# Patient Record
Sex: MALE | Race: BLACK OR AFRICAN AMERICAN | NOT HISPANIC OR LATINO | URBAN - METROPOLITAN AREA
[De-identification: names, ages, dates, MRNs, and addresses within clinical notes are randomized per-mention and may not be internally consistent; named-entity substitution may affect disease eponyms.]

---

## 2020-03-18 ENCOUNTER — HOSPITAL ENCOUNTER (EMERGENCY)
Facility: HOSPITAL | Age: 35
Discharge: HOME | End: 2020-03-18
Attending: EMERGENCY MEDICINE
Payer: COMMERCIAL

## 2020-03-18 VITALS
WEIGHT: 193 LBS | RESPIRATION RATE: 16 BRPM | SYSTOLIC BLOOD PRESSURE: 116 MMHG | OXYGEN SATURATION: 99 % | TEMPERATURE: 99.6 F | HEIGHT: 71 IN | BODY MASS INDEX: 27.02 KG/M2 | HEART RATE: 86 BPM | DIASTOLIC BLOOD PRESSURE: 74 MMHG

## 2020-03-18 DIAGNOSIS — J02.0 STREP PHARYNGITIS: Primary | ICD-10-CM

## 2020-03-18 LAB — S PYO DNA THROAT QL NAA+PROBE: DETECTED

## 2020-03-18 PROCEDURE — 63700000 HC SELF-ADMINISTRABLE DRUG: Performed by: EMERGENCY MEDICINE

## 2020-03-18 PROCEDURE — 87651 STREP A DNA AMP PROBE: CPT | Performed by: EMERGENCY MEDICINE

## 2020-03-18 PROCEDURE — 99283 EMERGENCY DEPT VISIT LOW MDM: CPT

## 2020-03-18 PROCEDURE — 63600000 HC DRUGS/DETAIL CODE: Performed by: EMERGENCY MEDICINE

## 2020-03-18 RX ORDER — DEXAMETHASONE SODIUM PHOSPHATE 10 MG/ML
10 INJECTION INTRAMUSCULAR; INTRAVENOUS ONCE
Status: COMPLETED | OUTPATIENT
Start: 2020-03-18 | End: 2020-03-18

## 2020-03-18 RX ORDER — PENICILLIN V POTASSIUM 250 MG/1
500 TABLET, FILM COATED ORAL ONCE
Status: COMPLETED | OUTPATIENT
Start: 2020-03-18 | End: 2020-03-18

## 2020-03-18 RX ORDER — PENICILLIN V POTASSIUM 500 MG/1
500 TABLET, FILM COATED ORAL 4 TIMES DAILY
Qty: 40 TABLET | Refills: 0 | Status: SHIPPED | OUTPATIENT
Start: 2020-03-18 | End: 2020-03-25

## 2020-03-18 RX ADMIN — PENICILLIN V POTASSIUM 500 MG: 250 TABLET, FILM COATED ORAL at 11:32

## 2020-03-18 RX ADMIN — DEXAMETHASONE SODIUM PHOSPHATE 10 MG: 10 INJECTION INTRAMUSCULAR; INTRAVENOUS at 11:30

## 2020-03-18 ASSESSMENT — ENCOUNTER SYMPTOMS
CHEST TIGHTNESS: 0
FATIGUE: 0
SORE THROAT: 1
DIZZINESS: 0
RHINORRHEA: 0
FEVER: 0
WEAKNESS: 0
VOICE CHANGE: 0
BACK PAIN: 0
ABDOMINAL PAIN: 0
DIARRHEA: 0
VOMITING: 0
COUGH: 0
HEADACHES: 0
SINUS CONGESTION: 0
ACTIVITY CHANGE: 0
CHILLS: 0
SHORTNESS OF BREATH: 0
NAUSEA: 0
TROUBLE SWALLOWING: 0

## 2020-03-18 NOTE — ED PROVIDER NOTES
HPI     Chief Complaint   Patient presents with   • Fever   • Sore Throat        34-year-old male presented to the emergency department for evaluation of throat pain.  Patient states the pain has been present over the past day and 1/2 to 2 days at this point.  And does endorse feeling warm at home.  Denies any associated cough.  No recent travels or sick contacts.  Patient states a history of recurrent strep pharyngitis.      History provided by:  Patient  Sore Throat   Location:  Generalized  Quality:  Aching  Severity:  Mild  Onset quality:  Gradual  Timing:  Constant  Progression:  Worsening  Chronicity:  Recurrent  Relieved by:  Nothing  Worsened by:  Drinking  Ineffective treatments:  None tried  Associated symptoms: no abdominal pain, no chest pain, no chills, no cough, no fever, no headaches, no rash, no rhinorrhea, no shortness of breath, no sinus congestion, no trouble swallowing and no voice change         Patient History     History reviewed. No pertinent past medical history.    History reviewed. No pertinent surgical history.    History reviewed. No pertinent family history.    Social History     Tobacco Use   • Smoking status: Former Smoker     Last attempt to quit: 2010     Years since quitting: 10.2   • Smokeless tobacco: Never Used   Substance Use Topics   • Alcohol use: Yes   • Drug use: Yes     Types: Marijuana     Comment: daily       Systems Reviewed from Nursing Triage:          Review of Systems     Review of Systems   Constitutional: Negative for activity change, chills, fatigue and fever.   HENT: Positive for sore throat. Negative for rhinorrhea, trouble swallowing and voice change.    Eyes: Negative for visual disturbance.   Respiratory: Negative for cough, chest tightness and shortness of breath.    Cardiovascular: Negative for chest pain and leg swelling.   Gastrointestinal: Negative for abdominal pain, diarrhea, nausea and vomiting.   Endocrine: Negative for polyuria.   Musculoskeletal:  "Negative for back pain.   Skin: Negative for rash.   Neurological: Negative for dizziness, weakness and headaches.   All other systems reviewed and are negative.       Physical Exam     ED Triage Vitals   Temp Heart Rate Resp BP SpO2   03/18/20 0919 03/18/20 0919 03/18/20 0919 03/18/20 0919 03/18/20 0919   37.4 °C (99.3 °F) 97 14 (!) 141/90 99 %      Temp Source Heart Rate Source Patient Position BP Location FiO2 (%) (Set)   03/18/20 0919 -- 03/18/20 1000 03/18/20 1000 --   Oral  Sitting Right upper arm                      Patient Vitals for the past 24 hrs:   BP Temp Temp src Pulse Resp SpO2 Height Weight   03/18/20 1100 122/77 -- -- 83 -- 99 % -- --   03/18/20 1000 (!) 112/57 -- -- 84 -- 98 % -- --   03/18/20 0937 -- 37.4 °C (99.4 °F) -- -- -- -- 1.803 m (5' 11\") 87.5 kg (193 lb)   03/18/20 0919 (!) 141/90 37.4 °C (99.3 °F) Oral 97 14 99 % -- --                                       Physical Exam   Constitutional: He is oriented to person, place, and time. He appears well-developed and well-nourished.   HENT:   Head: Normocephalic and atraumatic.   Mouth/Throat: Mucous membranes are normal. No uvula swelling. Oropharyngeal exudate present. No posterior oropharyngeal edema or posterior oropharyngeal erythema.   Eyes: Pupils are equal, round, and reactive to light. EOM are normal.   Neck: Normal range of motion. Neck supple.   Cardiovascular: Normal rate and regular rhythm.   Pulmonary/Chest: Effort normal.   Abdominal: Soft. He exhibits no distension.   Musculoskeletal: Normal range of motion.   Lymphadenopathy:     He has cervical adenopathy.   Neurological: He is alert and oriented to person, place, and time.   Skin: Skin is warm and dry.   Psychiatric: He has a normal mood and affect.   Nursing note and vitals reviewed.           Procedures    ED Course & MDM     Labs Reviewed   GROUP A STREP BY PCR, THROAT - Abnormal       Result Value    Strep A PCR, Throat Detected (*)        No orders to display "               MDM  Number of Diagnoses or Management Options  Strep pharyngitis:   Diagnosis management comments: Patient will be there for strep pharyngitis.  Low suspicion for other respiratory pathogens given the patient's physical exam and labs.  Will give penicillin, Decadron.       Amount and/or Complexity of Data Reviewed  Clinical lab tests: ordered and reviewed    Risk of Complications, Morbidity, and/or Mortality  Presenting problems: moderate  Diagnostic procedures: moderate  Management options: moderate    Patient Progress  Patient progress: stable           Clinical Impressions as of Mar 18 1121   Strep pharyngitis        Mark Ballard DO  03/18/20 1124

## 2021-02-12 ENCOUNTER — APPOINTMENT (EMERGENCY)
Dept: RADIOLOGY | Facility: HOSPITAL | Age: 36
End: 2021-02-12
Attending: EMERGENCY MEDICINE
Payer: COMMERCIAL

## 2021-02-12 ENCOUNTER — HOSPITAL ENCOUNTER (EMERGENCY)
Facility: HOSPITAL | Age: 36
Discharge: HOME | End: 2021-02-12
Attending: EMERGENCY MEDICINE
Payer: COMMERCIAL

## 2021-02-12 VITALS
TEMPERATURE: 97.5 F | HEIGHT: 72 IN | RESPIRATION RATE: 18 BRPM | HEART RATE: 70 BPM | OXYGEN SATURATION: 99 % | WEIGHT: 180 LBS | BODY MASS INDEX: 24.38 KG/M2 | DIASTOLIC BLOOD PRESSURE: 68 MMHG | SYSTOLIC BLOOD PRESSURE: 109 MMHG

## 2021-02-12 DIAGNOSIS — F41.9 ANXIETY: Primary | ICD-10-CM

## 2021-02-12 LAB
ALBUMIN SERPL-MCNC: 4 G/DL (ref 3.4–5)
ALP SERPL-CCNC: 38 IU/L (ref 35–126)
ALT SERPL-CCNC: 26 IU/L (ref 16–63)
ANION GAP SERPL CALC-SCNC: 13 MEQ/L (ref 3–15)
AST SERPL-CCNC: 25 IU/L (ref 15–41)
ATRIAL RATE: 82
BASOPHILS # BLD: 0.03 K/UL (ref 0.01–0.1)
BASOPHILS NFR BLD: 0.4 %
BILIRUB SERPL-MCNC: 1.3 MG/DL (ref 0.3–1.2)
BUN SERPL-MCNC: 14 MG/DL (ref 8–20)
CALCIUM SERPL-MCNC: 9.3 MG/DL (ref 8.9–10.3)
CHLORIDE SERPL-SCNC: 102 MEQ/L (ref 98–109)
CO2 SERPL-SCNC: 22 MEQ/L (ref 22–32)
CREAT SERPL-MCNC: 1.2 MG/DL (ref 0.8–1.3)
DIFFERENTIAL METHOD BLD: NORMAL
EOSINOPHIL # BLD: 0.04 K/UL (ref 0.04–0.54)
EOSINOPHIL NFR BLD: 0.6 %
ERYTHROCYTE [DISTWIDTH] IN BLOOD BY AUTOMATED COUNT: 12.2 % (ref 11.6–14.4)
GFR SERPL CREATININE-BSD FRML MDRD: >60 ML/MIN/1.73M*2
GLUCOSE SERPL-MCNC: 78 MG/DL (ref 70–99)
HCT VFR BLDCO AUTO: 47.3 % (ref 40.1–51)
HGB BLD-MCNC: 16.2 G/DL (ref 13.7–17.5)
IMM GRANULOCYTES # BLD AUTO: 0.02 K/UL (ref 0–0.08)
IMM GRANULOCYTES NFR BLD AUTO: 0.3 %
LYMPHOCYTES # BLD: 1.37 K/UL (ref 1.2–3.5)
LYMPHOCYTES NFR BLD: 19.8 %
MAGNESIUM SERPL-MCNC: 2.1 MG/DL (ref 1.8–2.5)
MCH RBC QN AUTO: 31.7 PG (ref 28–33.2)
MCHC RBC AUTO-ENTMCNC: 34.2 G/DL (ref 32.2–36.5)
MCV RBC AUTO: 92.6 FL (ref 83–98)
MONOCYTES # BLD: 0.67 K/UL (ref 0.3–1)
MONOCYTES NFR BLD: 9.7 %
NEUTROPHILS # BLD: 4.79 K/UL (ref 1.7–7)
NEUTS SEG NFR BLD: 69.2 %
NRBC BLD-RTO: 0 %
P AXIS: 81
PDW BLD AUTO: 9.5 FL (ref 9.4–12.4)
PLATELET # BLD AUTO: 313 K/UL (ref 150–350)
POTASSIUM SERPL-SCNC: 4 MEQ/L (ref 3.6–5.1)
PR INTERVAL: 166
PROT SERPL-MCNC: 6.9 G/DL (ref 6–8.2)
QRS DURATION: 80
QT INTERVAL: 352
QTC CALCULATION(BAZETT): 411
R AXIS: 96
RBC # BLD AUTO: 5.11 M/UL (ref 4.5–5.8)
SODIUM SERPL-SCNC: 137 MEQ/L (ref 136–144)
T WAVE AXIS: 46
TROPONIN I SERPL-MCNC: <0.03 NG/ML
VENTRICULAR RATE: 82
WBC # BLD AUTO: 6.92 K/UL (ref 3.8–10.5)

## 2021-02-12 PROCEDURE — 80053 COMPREHEN METABOLIC PANEL: CPT | Performed by: EMERGENCY MEDICINE

## 2021-02-12 PROCEDURE — 85025 COMPLETE CBC W/AUTO DIFF WBC: CPT | Performed by: EMERGENCY MEDICINE

## 2021-02-12 PROCEDURE — 71045 X-RAY EXAM CHEST 1 VIEW: CPT

## 2021-02-12 PROCEDURE — 36415 COLL VENOUS BLD VENIPUNCTURE: CPT | Performed by: EMERGENCY MEDICINE

## 2021-02-12 PROCEDURE — 93005 ELECTROCARDIOGRAM TRACING: CPT | Performed by: EMERGENCY MEDICINE

## 2021-02-12 PROCEDURE — 84484 ASSAY OF TROPONIN QUANT: CPT | Performed by: EMERGENCY MEDICINE

## 2021-02-12 PROCEDURE — 83735 ASSAY OF MAGNESIUM: CPT | Performed by: EMERGENCY MEDICINE

## 2021-02-12 PROCEDURE — 63700000 HC SELF-ADMINISTRABLE DRUG: Performed by: EMERGENCY MEDICINE

## 2021-02-12 PROCEDURE — 99283 EMERGENCY DEPT VISIT LOW MDM: CPT | Mod: 25

## 2021-02-12 PROCEDURE — 93010 ELECTROCARDIOGRAM REPORT: CPT | Performed by: INTERNAL MEDICINE

## 2021-02-12 RX ORDER — HYDROXYZINE HYDROCHLORIDE 25 MG/1
25 TABLET, FILM COATED ORAL ONCE
Status: COMPLETED | OUTPATIENT
Start: 2021-02-12 | End: 2021-02-12

## 2021-02-12 RX ADMIN — HYDROXYZINE HYDROCHLORIDE 25 MG: 25 TABLET ORAL at 11:15

## 2021-02-12 ASSESSMENT — ENCOUNTER SYMPTOMS
VOMITING: 0
BACK PAIN: 0
FLANK PAIN: 0
COLOR CHANGE: 0
DIARRHEA: 0
HEADACHES: 0
NAUSEA: 1
ABDOMINAL PAIN: 0
FEVER: 0
COUGH: 0
DYSURIA: 0
PALPITATIONS: 0
TROUBLE SWALLOWING: 0
ARTHRALGIAS: 0
SHORTNESS OF BREATH: 1
FATIGUE: 0
SORE THROAT: 0

## 2021-02-12 NOTE — ED PROVIDER NOTES
Emergency Medicine Note  HPI   HISTORY OF PRESENT ILLNESS       History provided by:  Patient  Anxiety  Location:  General  Severity:  Mild  Onset quality:  Gradual  Duration:  5 days  Timing:  Intermittent  Progression:  Waxing and waning  Chronicity:  New  Context:  Unsure of triggers  Relieved by:  Nothing  Worsened by:  Nothing  Ineffective treatments:  None tried  Associated symptoms: chest pain, nausea and shortness of breath    Associated symptoms: no abdominal pain, no cough, no diarrhea, no fatigue, no fever, no headaches, no rash, no sore throat and no vomiting          Patient History   PAST HISTORY     Reviewed from Nursing Triage:      No past medical history on file.    No past surgical history on file.    No family history on file.    Social History     Tobacco Use   • Smoking status: Former Smoker     Quit date:      Years since quittin.1   • Smokeless tobacco: Never Used   Substance Use Topics   • Alcohol use: Yes   • Drug use: Yes     Types: Marijuana     Comment: daily         Review of Systems   REVIEW OF SYSTEMS     Review of Systems   Constitutional: Negative for fatigue and fever.   HENT: Negative for sore throat and trouble swallowing.    Respiratory: Positive for shortness of breath. Negative for cough.    Cardiovascular: Positive for chest pain. Negative for palpitations.   Gastrointestinal: Positive for nausea. Negative for abdominal pain, diarrhea and vomiting.   Genitourinary: Negative for dysuria and flank pain.   Musculoskeletal: Negative for arthralgias and back pain.   Skin: Negative for color change and rash.   Neurological: Negative for syncope and headaches.   All other systems reviewed and are negative.        VITALS     ED Vitals    Date/Time Temp Pulse Resp BP SpO2 [facility]   21 0939 36.4 °C (97.5 °F) 82 20 134/78 99 % PZ                       Physical Exam   PHYSICAL EXAM     Physical Exam  Vitals signs and nursing note reviewed.   Constitutional:       Appearance:  He is well-developed.   HENT:      Head: Normocephalic and atraumatic.   Eyes:      General: No scleral icterus.     Conjunctiva/sclera: Conjunctivae normal.   Neck:      Musculoskeletal: Neck supple.      Vascular: No JVD.   Cardiovascular:      Rate and Rhythm: Normal rate and regular rhythm.   Pulmonary:      Effort: Pulmonary effort is normal. No respiratory distress.      Breath sounds: Normal breath sounds.   Abdominal:      Palpations: Abdomen is soft.      Tenderness: There is no abdominal tenderness.   Musculoskeletal:         General: No tenderness.   Skin:     General: Skin is warm and dry.   Neurological:      Mental Status: He is alert.   Psychiatric:         Behavior: Behavior normal.           PROCEDURES     Procedures     DATA     Results     None          Imaging Results    None         No orders to display       Scoring tools                               ED Course & MDM   MDM / ED COURSE and CLINICAL IMPRESSIONS     MDM    ED Course as of Feb 12 1134 Fri Feb 12, 2021   1023 36yo male c/o feeling anxious x 5 days. Initially had insomnia. Now has intermittent palps, CP and anorexia. Occas feels SOB with palps. Occas lightheadedness. No new stressors, meds, drugs, etc. Had anxiety as child. No SI/HI.  EKG-NSR, T inversion III, nonspecific ST-T changes  Will check labs, CXR, give hydroxyzine and reassess.  Will check labs, CXR and reasess.    [NICO]   1131 Labs stable  Feels relaxed s/p hydroxyzine, but not dizzy or sleepy.  Feels comfortable returning home    [NICO]      ED Course User Index  [NICO] Zuleyka Gautam MD         Clinical Impressions as of Feb 12 1134   Anxiety            Zuleyka Gautam MD  02/12/21 1134

## 2021-03-15 ENCOUNTER — OFFICE VISIT (OUTPATIENT)
Dept: INTERNAL MEDICINE | Facility: HOSPITAL | Age: 36
End: 2021-03-15
Attending: STUDENT IN AN ORGANIZED HEALTH CARE EDUCATION/TRAINING PROGRAM
Payer: COMMERCIAL

## 2021-03-15 ENCOUNTER — APPOINTMENT (OUTPATIENT)
Dept: LAB | Facility: HOSPITAL | Age: 36
End: 2021-03-15
Attending: STUDENT IN AN ORGANIZED HEALTH CARE EDUCATION/TRAINING PROGRAM
Payer: COMMERCIAL

## 2021-03-15 VITALS
TEMPERATURE: 97.7 F | HEART RATE: 77 BPM | HEIGHT: 72 IN | WEIGHT: 183 LBS | SYSTOLIC BLOOD PRESSURE: 113 MMHG | OXYGEN SATURATION: 100 % | DIASTOLIC BLOOD PRESSURE: 62 MMHG | BODY MASS INDEX: 24.79 KG/M2

## 2021-03-15 DIAGNOSIS — F41.9 ANXIETY DISORDER, UNSPECIFIED: ICD-10-CM

## 2021-03-15 DIAGNOSIS — Z00.00 ENCOUNTER FOR GENERAL ADULT MEDICAL EXAMINATION WITHOUT ABNORMAL FINDINGS: ICD-10-CM

## 2021-03-15 DIAGNOSIS — F41.9 ANXIETY: Primary | ICD-10-CM

## 2021-03-15 DIAGNOSIS — Z00.00 HEALTH CARE MAINTENANCE: ICD-10-CM

## 2021-03-15 PROCEDURE — 3008F BODY MASS INDEX DOCD: CPT | Performed by: STUDENT IN AN ORGANIZED HEALTH CARE EDUCATION/TRAINING PROGRAM

## 2021-03-15 PROCEDURE — 99213 OFFICE O/P EST LOW 20 MIN: CPT | Mod: GC | Performed by: STUDENT IN AN ORGANIZED HEALTH CARE EDUCATION/TRAINING PROGRAM

## 2021-03-15 RX ORDER — QUETIAPINE FUMARATE 50 MG/1
50 TABLET, FILM COATED ORAL NIGHTLY
Qty: 30 TABLET | Refills: 0 | Status: SHIPPED | OUTPATIENT
Start: 2021-03-15 | End: 2021-04-10

## 2021-03-15 RX ORDER — HYDROXYZINE PAMOATE 25 MG/1
25 CAPSULE ORAL 2 TIMES DAILY PRN
Qty: 30 CAPSULE | Refills: 0 | Status: SHIPPED | OUTPATIENT
Start: 2021-03-15 | End: 2021-06-23

## 2021-03-15 ASSESSMENT — ENCOUNTER SYMPTOMS
RECTAL PAIN: 0
MUSCULOSKELETAL NEGATIVE: 1
EYE DISCHARGE: 0
FACIAL SWELLING: 0
BLOOD IN STOOL: 0
NAUSEA: 0
FEVER: 0
EYE ITCHING: 0
NEUROLOGICAL NEGATIVE: 1
DIARRHEA: 0
VOICE CHANGE: 0
STRIDOR: 0
CHOKING: 0
UNEXPECTED WEIGHT CHANGE: 0
SHORTNESS OF BREATH: 0
SINUS PRESSURE: 0
BRUISES/BLEEDS EASILY: 0
EYE REDNESS: 0
FATIGUE: 0
SINUS PAIN: 0
CHEST TIGHTNESS: 0
DIAPHORESIS: 0
EYE PAIN: 0
COUGH: 0
ADENOPATHY: 0
VOMITING: 0
ABDOMINAL PAIN: 0
SORE THROAT: 0
TROUBLE SWALLOWING: 0
NERVOUS/ANXIOUS: 1
CONSTIPATION: 0
ACTIVITY CHANGE: 0
WHEEZING: 0
ANAL BLEEDING: 0
EYES NEGATIVE: 1
PHOTOPHOBIA: 0
PALPITATIONS: 0

## 2021-03-15 ASSESSMENT — PATIENT HEALTH QUESTIONNAIRE - PHQ9: SUM OF ALL RESPONSES TO PHQ9 QUESTIONS 1 & 2: 0

## 2021-03-15 ASSESSMENT — PAIN SCALES - GENERAL: PAINLEVEL: 0-NO PAIN

## 2021-03-15 NOTE — PATIENT INSTRUCTIONS
Dear Mr Ocampo    In our appointment today, we talked about the following:  Establishing care        To-do's  Remember to carry out the following tests/referrals/follow-ups:  1. Please take 1/2 tablet of Seroquel for the first 2 weeks . If you tolerate it, then take 1 pill nighthly  2. Please do labs   3. Please call any psychiatrist to follow up       Please take note of the medication changes as mentioned above. Listed in this instructions packet is the most updated version of your medication list.     Next appointment  Let's plan to meet for a follow-up appointment in 1 month    Thank you for choosing [facility]!

## 2021-03-15 NOTE — PROGRESS NOTES
[facility]  History & Physical        CHIEF COMPLAINT   Chief Complaint   Patient presents with   • Establish Care        HISTORY OF PRESENT ILLNESS      This is a 35 y.o. male with a past medical history of anxiety,panic attacks and insomina,  who present today for establishing care.    Panic attacks /anxiety:  PAtiet think it all started during Hooks's weekeing. Patient couldn't sleep at night. Since that day, he started experiencing every day panic attacks where he felt his heart pounding, he was sweating, almost fainting and crying. He cant think of any trigger to all this situation. He did not try any medication. He had 2 ED vsit for panic attacks.   He denies mood swings, no suicidal ideation. He reports that over the last 2 weeks got better. He is currently waking up three times at night but is able to function .  -[facility]  -[facility]      PMHx: anxiety and panic attack  FMHx: does not know fam  SHX: none  Smoking: marijuana  Alcohol:once/twice a week  Drug use: none  Social:selling cars  Sexual life:multiple partners  dont use protection with one person  Vaccine: not sure ?      PAST MEDICAL AND SURGICAL HISTORY      PMHx:  History reviewed. No pertinent past medical history.    PSHx:  History reviewed. No pertinent surgical history.    MEDICATIONS        Current Outpatient Medications:   •  hydrOXYzine (VistariL) 25 mg capsule, Take 1 capsule (25 mg total) by mouth 2 (two) times a day as needed for itching for up to 10 days., Disp: 30 capsule, Rfl: 0  •  QUEtiapine (SEROqueL) 50 mg tablet, Take 1 tablet (50 mg total) by mouth nightly., Disp: 30 tablet, Rfl: 0    ALLERGIES      Patient has no known allergies.    FAMILY HISTORY      Family History   Family history unknown: Yes       SOCIAL HISTORY      Social History     Socioeconomic History   • Marital status: Single     Spouse name: None   • Number of children: None   • Years of education: None   • Highest education  level: None   Occupational History   • None   Social Needs   • Financial resource strain: None   • Food insecurity     Worry: None     Inability: None   • Transportation needs     Medical: None     Non-medical: None   Tobacco Use   • Smoking status: Former Smoker     Quit date:      Years since quittin.2   • Smokeless tobacco: Never Used   Substance and Sexual Activity   • Alcohol use: Yes   • Drug use: Yes     Types: Marijuana     Comment: daily   • Sexual activity: None   Lifestyle   • Physical activity     Days per week: None     Minutes per session: None   • Stress: None   Relationships   • Social connections     Talks on phone: None     Gets together: None     Attends Hoahaoism service: None     Active member of club or organization: None     Attends meetings of clubs or organizations: None     Relationship status: None   • Intimate partner violence     Fear of current or ex partner: None     Emotionally abused: None     Physically abused: None     Forced sexual activity: None   Other Topics Concern   • None   Social History Narrative   • None       REVIEW OF SYSTEMS      Review of Systems   Constitutional: Negative for activity change, diaphoresis, fatigue, fever and unexpected weight change.   HENT: Positive for postnasal drip. Negative for congestion, dental problem, drooling, ear discharge, ear pain, facial swelling, hearing loss, mouth sores, sinus pressure, sinus pain, sneezing, sore throat, tinnitus, trouble swallowing and voice change.    Eyes: Negative.  Negative for photophobia, pain, discharge, redness, itching and visual disturbance.   Respiratory: Negative for cough, choking, chest tightness, shortness of breath, wheezing and stridor.    Cardiovascular: Negative for chest pain, palpitations and leg swelling.   Gastrointestinal: Negative for abdominal pain, anal bleeding, blood in stool, constipation, diarrhea, nausea, rectal pain and vomiting.   Endocrine: Negative for cold intolerance and  heat intolerance.   Genitourinary: Negative.    Musculoskeletal: Negative.    Skin: Negative.    Allergic/Immunologic: Positive for environmental allergies.   Neurological: Negative.    Hematological: Negative for adenopathy. Does not bruise/bleed easily.   Psychiatric/Behavioral: The patient is nervous/anxious.        PHYSICAL EXAMINATION      Visit Vitals  /62 (BP Location: Right upper arm, Patient Position: Sitting)   Pulse 77   Temp 36.5 °C (97.7 °F) (Temporal)   Ht 1.829 m (6')   Wt 83 kg (183 lb)   SpO2 100%   BMI 24.82 kg/m²     Body mass index is 24.82 kg/m².    Physical Exam  Constitutional:       General: He is not in acute distress.     Appearance: Normal appearance. He is not ill-appearing.   HENT:      Head: Normocephalic and atraumatic.      Right Ear: There is no impacted cerumen.      Left Ear: There is no impacted cerumen.      Nose: No congestion or rhinorrhea.      Mouth/Throat:      Pharynx: No oropharyngeal exudate or posterior oropharyngeal erythema.   Eyes:      General: No scleral icterus.        Right eye: No discharge.         Left eye: No discharge.   Neck:      Vascular: No carotid bruit.   Cardiovascular:      Rate and Rhythm: Normal rate and regular rhythm.      Heart sounds: No murmur.   Pulmonary:      Effort: Pulmonary effort is normal. No respiratory distress.      Breath sounds: No stridor. No wheezing.   Abdominal:      General: Abdomen is flat. There is no distension.      Palpations: Abdomen is soft. There is no mass.      Tenderness: There is no abdominal tenderness.      Hernia: No hernia is present.   Musculoskeletal:      Right lower leg: No edema.      Left lower leg: No edema.   Lymphadenopathy:      Cervical: No cervical adenopathy.   Skin:     Coloration: Skin is not jaundiced.      Findings: No bruising or lesion.   Neurological:      General: No focal deficit present.      Mental Status: He is alert and oriented to person, place, and time.   Psychiatric:          Mood and Affect: Mood normal.                  Health Maintenance Due   Topic Date Due   • Varicella Vaccines (1 of 2 - 2-dose childhood series) Never done   • HIV Screening  Never done   • Hepatitis C Screening  Never done   • DTaP, Tdap, and Td Vaccines (1 - Tdap) Never done   • Influenza Vaccine (1) Never done          ASSESSMENT AND PLAN   Problem List Items Addressed This Visit        Other    Anxiety - Primary     DDX: pt can have a bipolar disorder or CARLOS with panic attack vs hyperthyroidism (less likely!)  -cannot start on SSRI incase he has BP disorder-inc risk of suicidal  - Start seroquel 0.5 tablet a day for a month and increase to 1 tablet if tolerated  - hydroxyzine PRN  -Psychology and psychiatry referral  -TSH         Relevant Orders    Ambulatory referral to [facility]    Ambulatory referral to Psychiatry    [facility]    Health care maintenance     -lipid panel- he is a 35 y o male pt  -HIV, hep C  -neisseria and gonorrhea          Relevant Orders    Lipid panel    HIV 1,2 AB P24 AG    Hepatitis C antibody    Neisseria gonorrhoeae DNA, PCR    Chlamydia/GC RNA:ThinPrep,Urine,Swab             Briseida Gama MD  03/15/21  5:26 PM

## 2021-03-15 NOTE — PROGRESS NOTES
I performed a history and physical examination of the patient and discussed the management with the Resident. I reviewed the Resident's note and agree with the documented findings and plan of care, except for my comments below or within the additional notes today.  Pt with panic/anxiety. Does not admit to having it before early February. Has been to the ED x2 recently. Had much insomnia at the time but this has improved recently; the panic is occurring every couple of days. Agree with low dose seroquel, referral for counseling and also psychiatry. Can breathe in paper bag if needed during an attack.  F/u in 3-4 wks.      STANLEY Worthington MD

## 2021-03-15 NOTE — ASSESSMENT & PLAN NOTE
DDX: pt can have a bipolar disorder or CARLOS with panic attack vs hyperthyroidism (less likely!)  -cannot start on SSRI incase he has BP disorder-inc risk of suicidal  - Start seroquel 0.5 tablet a day for a month and increase to 1 tablet if tolerated  - hydroxyzine PRN  -Psychology and psychiatry referral  -TSH

## 2021-03-16 LAB
CHOLEST SERPL-MCNC: 157 MG/DL (ref 100–199)
HCV AB S/CO SERPL IA: <0.1 S/CO RATIO (ref 0–0.9)
HDLC SERPL-MCNC: 55 MG/DL
HIV 1+2 AB+HIV1 P24 AG SERPL QL IA: NON REACTIVE
LDLC SERPL CALC-MCNC: 83 MG/DL (ref 0–99)
TRIGL SERPL-MCNC: 102 MG/DL (ref 0–149)
TSH SERPL DL<=0.005 MIU/L-ACNC: 0.37 UIU/ML (ref 0.45–4.5)
VLDLC SERPL CALC-MCNC: 19 MG/DL (ref 5–40)

## 2021-03-17 LAB
C TRACH RRNA SPEC QL NAA+PROBE: NEGATIVE
N GONORRHOEA RRNA SPEC QL NAA+PROBE: NEGATIVE

## 2021-03-22 ENCOUNTER — TELEPHONE (OUTPATIENT)
Dept: INTERNAL MEDICINE | Facility: HOSPITAL | Age: 36
End: 2021-03-22

## 2021-03-22 DIAGNOSIS — F41.9 ANXIETY: Primary | ICD-10-CM

## 2021-03-22 NOTE — TELEPHONE ENCOUNTER
Pt presented recently with panic attacks and anxiety. TSH was ordered as well as referral to psychiatry and psychology. His TSH was 0.366  I called patient and asked him to do :  -serum free T4 and T3  Thank you  Briseida Gama MD

## 2021-03-24 ENCOUNTER — APPOINTMENT (OUTPATIENT)
Dept: LAB | Facility: HOSPITAL | Age: 36
End: 2021-03-24
Attending: STUDENT IN AN ORGANIZED HEALTH CARE EDUCATION/TRAINING PROGRAM
Payer: COMMERCIAL

## 2021-03-24 DIAGNOSIS — F41.9 ANXIETY DISORDER, UNSPECIFIED: ICD-10-CM

## 2021-03-25 LAB
T3FREE SERPL-MCNC: 3.2 PG/ML (ref 2–4.4)
T4 FREE SERPL-MCNC: 1.25 NG/DL (ref 0.82–1.77)

## 2021-04-08 NOTE — TELEPHONE ENCOUNTER
Pharmacy requesting refill, review    Patient:   Lucio Ocampo  Male, 35 y.o., 1985  Phone:   [phone] (W)  PCP:   Briseida Gama MD  Last Weight:   83 kg (183 lb)  HM Due?:   Due  Allergies:   No Known Allergies  MRN:   [id number]  Primary Ins.:   [employer]  Pharmacy:   [facility]/PHARMACY #[id number] Declan CAMACHO [address] AT [facility] [[id number]]  Preferred Lab:   [facility]  Next Appt  With Internal Medicine (Briseida Gama MD)  04/19/2021 at 1:00 PM  My Pat List Reminders:   None  Rx Auth Request  Received: Today  Message Contents   Interface, Surescripts In  P [facility] Clinical Support P   Caller: Unspecified (Today, 12:41 AM)             Request received via interface.    Requested Prescriptions     Name from pharmacy: QUETIAPINE FUMARATE 50 MG TAB         Will file in chart as: QUEtiapine (SEROquel) 50 mg tablet    Sig: TAKE 1 TABLET BY MOUTH EVERY DAY AT NIGHT    Disp:  30 tablet    Refills:  0 (Pharmacy requested: Not specified)    Start: 4/8/2021    Class: Normal    Non-formulary    Last ordered: 3 weeks ago by Briseida Gama MD Last refill: 3/15/2021    Rx #: [id number]       To be filled at: [facility]/pharmacy #Don CAMACHO [address]         Med Refill

## 2021-04-10 RX ORDER — QUETIAPINE FUMARATE 50 MG/1
TABLET, FILM COATED ORAL
Qty: 30 TABLET | Refills: 0 | Status: SHIPPED | OUTPATIENT
Start: 2021-04-10 | End: 2021-04-19

## 2021-04-15 ENCOUNTER — TELEPHONE (OUTPATIENT)
Dept: FAMILY MEDICINE | Facility: CLINIC | Age: 36
End: 2021-04-15

## 2021-04-15 NOTE — TELEPHONE ENCOUNTER
Called patient to offer him a behavioral health appointment with another provider for the same day he was scheduled with Kasandra Bush (5/4) 1pm or 2pm, or the week prior (4/27) at 1pm or 2pm.  Instructed patient that he can call [facility]  and ask them to send me a message regarding his availability for either of those days.   ([facility]  wont have access to schedule BH appts until a new  provider starts in mid-May. We have a couple of slots available from other providers that we reserved for pts who were scheduled with Kasandra prior to her resignation).  In the meantime, I will change his scheduled visit time on 5/4 to the available providers' slot at 2pm to hold the appt for this pt.

## 2021-04-15 NOTE — TELEPHONE ENCOUNTER
My previous note from today was accidentally marked sensitive. It stated:  Called patient to offer him a behavioral health appointment with another provider for the same day he was scheduled with Kasandra Bush (5/4) 1pm or 2pm, or the week prior (4/27) at 1pm or 2pm.  Instructed patient that he can call [facility]  and ask them to send me a message regarding his availability for either of those days.   ([facility]  wont have access to schedule BH appts until a new  provider starts in mid-May. We have a couple of slots available from other providers that we reserved for pts who were scheduled with Kasandra prior to her resignation).  In the meantime, I will change his scheduled visit time on 5/4 to the available providers' slot at 2pm to hold the appt for this pt.

## 2021-04-15 NOTE — TELEPHONE ENCOUNTER
Returned pt's call, confirmed he can attend the appt on 4/27 at 2pm, explained MyChart video visit, and re-sent activation email to pt.

## 2021-04-15 NOTE — TELEPHONE ENCOUNTER
Hi,   Patient is calling back and stating the 27th works for him for an appt and he would prefer the 1pm appt.     Please call back and confirm this with patient. Patient can be reached at [phone]

## 2021-04-19 ENCOUNTER — OFFICE VISIT (OUTPATIENT)
Dept: INTERNAL MEDICINE | Facility: HOSPITAL | Age: 36
End: 2021-04-19
Attending: STUDENT IN AN ORGANIZED HEALTH CARE EDUCATION/TRAINING PROGRAM
Payer: COMMERCIAL

## 2021-04-19 VITALS
WEIGHT: 185 LBS | TEMPERATURE: 97.2 F | BODY MASS INDEX: 25.06 KG/M2 | SYSTOLIC BLOOD PRESSURE: 108 MMHG | DIASTOLIC BLOOD PRESSURE: 63 MMHG | HEIGHT: 72 IN | HEART RATE: 83 BPM | OXYGEN SATURATION: 100 % | RESPIRATION RATE: 18 BRPM

## 2021-04-19 DIAGNOSIS — F41.9 ANXIETY: Primary | ICD-10-CM

## 2021-04-19 DIAGNOSIS — Z00.00 HEALTH CARE MAINTENANCE: ICD-10-CM

## 2021-04-19 PROCEDURE — 3008F BODY MASS INDEX DOCD: CPT | Performed by: STUDENT IN AN ORGANIZED HEALTH CARE EDUCATION/TRAINING PROGRAM

## 2021-04-19 PROCEDURE — 99213 OFFICE O/P EST LOW 20 MIN: CPT | Mod: GC | Performed by: STUDENT IN AN ORGANIZED HEALTH CARE EDUCATION/TRAINING PROGRAM

## 2021-04-19 NOTE — ASSESSMENT & PLAN NOTE
Pt reports feeling better. He only had 2 episodes of panic attacks since his last visit,    -never took seroquel  -continue  hydroxyzine PRN  -Psychology and psychiatry referral; he is scheduled with psychology on April 27

## 2021-04-19 NOTE — Clinical Note
FYI I changed the billing on this - most visits should be [id number] or [id number]. I think you may be using a different set of consult codes - let me know if this doesn't make sense

## 2021-04-19 NOTE — PATIENT INSTRUCTIONS
Dear Ms Ocampo  In our appointment today, we talked about the following:  Panic attacks      To-do's  Remember to carry out the following tests/referrals/follow-ups:  1. Please do not forget your appointment with psychology on April 27 at 2pm    Please take note of the medication changes as mentioned above. Listed in this instructions packet is the most updated version of your medication list.     Next appointment  Let's plan to meet for a follow-up appointment in 1 year      Thank you for choosing [facility]!

## 2021-04-19 NOTE — PROGRESS NOTES
[facility]  Internal Medicine Progress Note       BRIEF ATTENDING DOCUMENTATION   Please see attestation section of note for attestation and any additional physician documentation not found here.    Type of faculty precepting: In room    S: 36 yo with hx of anxiety, here for routine follow up of chronic medical conditions. Doing much better. Never needed the low dose quetiapine. He has a psychology appointment scheduled.      O:  Visit Vitals  /63   Pulse 83   Temp 36.2 °C (97.2 °F) (Temporal)   Resp 18   Ht 1.829 m (6')   Wt 83.9 kg (185 lb)   SpO2 100%   BMI 25.09 kg/m²     Well appearing  NAD    A/P:  #Anxiety disorder: f/u with psychology as planned    Shane Kenny MD

## 2021-04-19 NOTE — PROGRESS NOTES
[facility]  Internal Medicine Progress Note       SUBJECTIVE   Lucio SILVIA Ocampo is a 35 y.o. year-old male with a past medical history of panic attacks who presents for follow up.  Patient reports that he only had 2 attacks since the last time I saw him. He reports that he never started Seroquel and only used once hydroxyzine.  He denies chest pain, nausea, vomiting, abdominal pain, headache, urinary symptoms, fever or chills.  He is interested in receiving the corona vaccine .       REVIEW OF SYSTEMS   Review of Systems   All other systems reviewed and are negative.       MEDICATIONS      No current outpatient medications on file.    PHYSICAL EXAMINATION   Vital signs:   Visit Vitals  /63   Pulse 83   Temp 36.2 °C (97.2 °F) (Temporal)   Resp 18   Ht 1.829 m (6')   Wt 83.9 kg (185 lb)   SpO2 100%   BMI 25.09 kg/m²     Physical Exam  Constitutional:       General: He is not in acute distress.     Appearance: He is not toxic-appearing.   HENT:      Nose: No congestion or rhinorrhea.      Mouth/Throat:      Pharynx: No oropharyngeal exudate or posterior oropharyngeal erythema.   Eyes:      General: No scleral icterus.  Cardiovascular:      Rate and Rhythm: Normal rate and regular rhythm.      Heart sounds: No murmur heard.     Pulmonary:      Effort: Pulmonary effort is normal. No respiratory distress.      Breath sounds: No stridor. No wheezing or rhonchi.   Abdominal:      General: Abdomen is flat. There is no distension.      Palpations: Abdomen is soft.      Tenderness: There is no abdominal tenderness. There is no guarding.   Musculoskeletal:      Right lower leg: No edema.      Left lower leg: No edema.   Neurological:      General: No focal deficit present.      Mental Status: He is alert and oriented to person, place, and time.   Psychiatric:         Mood and Affect: Mood normal.          LABS / IMAGING/STUDIES      Labs Reviewed:   Last BMP:  Lab Results   Component Value Date    NA  137 02/12/2021    K 4.0 02/12/2021     02/12/2021    CO2 22 02/12/2021    BUN 14 02/12/2021    CREATININE 1.2 02/12/2021       Last CBC:  Lab Results   Component Value Date    WBC 6.92 02/12/2021    HGB 16.2 02/12/2021    HCT 47.3 02/12/2021    MCV 92.6 02/12/2021     02/12/2021       Last Lipids:  Lab Results   Component Value Date    CHOL 157 03/15/2021    HDL 55 03/15/2021    LDLCALC 83 03/15/2021    TRIG 102 03/15/2021       Last three HgbA1c:  No results found for: HGBA1C    Last Microalbumin:  No results found for: MICROALBUR    Last TSH:  Lab Results   Component Value Date    TSH 0.366 (L) 03/15/2021       Last Vitamin D:  No results found for: REJO24KE            ASSESSMENT AND PLAN      Problem List Items Addressed This Visit        Other    Anxiety - Primary     Pt reports feeling better. He only had 2 episodes of panic attacks since his last visit,    -never took seroquel  -continue  hydroxyzine PRN  -Psychology and psychiatry referral; he is scheduled with psychology on April 27         Health care maintenance     Pt will schedule today his corona vaccine                HEALTHCARE MAINTENANCE   Health Maintenance Due   Topic Date Due   • DTaP, Tdap, and Td Vaccines (1 - Tdap) Never done          Briseida Gama MD  04/19/21  2:20 PM

## 2021-04-27 ENCOUNTER — TELEMEDICINE (OUTPATIENT)
Dept: FAMILY MEDICINE | Facility: CLINIC | Age: 36
End: 2021-04-27
Payer: COMMERCIAL

## 2021-04-27 DIAGNOSIS — F41.0 PANIC DISORDER: Primary | ICD-10-CM

## 2021-04-27 NOTE — PROGRESS NOTES
Integrated Behavioral Health Outpatient Initial Intake- Telehealth Visit Note  Visit Type Performed: Audio and Video    4/28/2021  Lucio Ocampo, a 35 y.o. male, who was contacted today for a behavioral health visit.    Verification of Patient Location:  The patient affirms they are currently located in the following state: [place]    This visit was conducted via telehealth/telephone in lieu of an in-person visit due to precautions related to the COVID-19 pandemic.    Request for Consent for tele-visit:  You and I are about to have a telehealth check-in or visit. This is allowed because you are already a patient of our practice, and you have requested it. Before starting our telehealth visit, I am required to get your consent for this virtual check-in or visit by telehealth. Do you consent?    Patient Response to Request for Consent: Yes    Informed Consent/Confidentiality:   Pt was explained the model of primary care behavioral health we provide at [facility], including the following:  The doctoral psychology intern/post-doctoral psychology fellow is under the direct supervision of a licensed psychologist (Judith Frost Psy.D., and Brandy Shaikh Psy.D.).   The psychology intern/fellow collaborates regularly with the pt’s PCP regarding the pt’s treatment. The integrated behavioral health progress notes are visible to the physicians and advanced practitioners in the practice where the pt is being seen. The visit diagnosis and appointment/scheduling information is visible to the patient's EPIC chart, to provide continuity of care across the [facility] system. The pt will also have access to view their notes via [employer] (pt portal).  This is a low-intensity model of care (we provide 8-10 visits of cognitive-behavioral therapy), and the patient has the right to other options of behavioral health care that are indicated for more severe conditions (ie: Traditional Outpatient psychotherapy, Intensive Outpatient Programs,  Partial Hospitalization Programs, and Inpatient services).  The intern/fellow is providing this care by participating in a training program, and therefore they can not be involved with any legal issues/forms (including disability, FMLA, etc.).    Also discussed were confidentiality and the limits of confidentiality (ie: if pt is at imminent risk of suicide, homicide, or reports child abuse/neglect, providers may need to break confidentiality to ensure the safety of the pt or to follow mandated reporting requirements).   Pt expressed understanding and consent: Yes      SUBJECTIVE     Reason for visit:  He presented today regarding a hx of panic attacks. He was seen at ED in 2021 and was subsequently referred to therapy.      History of Behavioral Health Treatment  Previous treatment: none    Substance Use History  ETOH/alcohol use: Unable to assess at this time  Other substance or supplement use: Unable to assess at this time    Social History  Significant relationships/Support Network: Pt has children and group of friends he speaks to regularly  Cultural Practices Taoist/Spiritual Beliefs pertinent to treatment: Unable to assess at this time  Additional pertinent historical information includes: Unable to assess at this time. Pt did describe childhood stressors as potentially contributing to current anxiety. Specific information was not provided at this time.    Symptoms  Depression symptoms: PHQ 9:  Little Interest or Pleasure in Doing Things: 0-->not at all    Feeling Down, Depressed or Hopeless: 1-->several days (down not depressed or hopeless)    Trouble Falling or Staying Asleep, or Sleeping Too Much: 1-->several days (staying asleep)    Feeling Tired or Having Little Energy: 1-->several days    Poor Appetite or Overeatin-->several days (poor appetite, recent weight loss)    Feeling Bad about Yourself - or that You are a Failure or Have Let Yourself or Your Family Down: 1-->several days    Trouble  "Concentrating on Things, Such as Reading the Newspaper or Watching Television: 1-->several days    Moving or Speaking So Slowly that Other People Could Have Noticed? Or the Opposite - Being So Fidgety: 0-->not at all (excitement is different)    Thoughts that You Would be Better Off Dead or of Hurting Yourself in Some Way: 0-->not at all    PHQ-9: Brief Depression Severity Measure Score: 6    Anxiety symptoms: CARLOS-7  Feeling nervous, anxious or on edge: 1-->Several days    Not being able to stop or control worryin-->Several days    Worrying too much about different things: 1-->Several days    Trouble relaxin-->Several days    Being so restless that it is hard to sit still: 0-->Not at all    Becoming easily annoyed or irritable: 1-->Several days    Feeling afraid as if something awful might happen: 0-->Not at all    GAD7 Total Score: : 5    Additional reported symptoms: pt reported hx of one panic attack around 11-11 y/o followed by a severe panic attack occurring on 21 when hospitalized at ED. No medical basis was uncovered at that time. Pt continues to experience panic attacks before/while driving. Pt identified symptoms including heart racing, sweating, chest pain, fear of future attacks, worry about \"losing control\" or having a \"heart attack\".      OBJECTIVE     Mental Status Exam  Appearance: Well Groomed  Speech: Regular  Psychomotor Functioning: WNL  Eye Contact: WNL  Orientation: Fully oriented  Attention: WNL  Concentration: WNL  Recent Memory: WNL  Remote Memory: WNL  Thought Content: Appropriate  Thought Process: WNL  Insight: Impaired, minimally  Perceptual Function: Normal  Delusions: None or age appropriate  Sleeping: Decreased  Appetite: No Change  Affect: Full Range  Mood: Euthymic (normal)    ASSESSMENT     Psychotropic medications: no medications taken.  No current outpatient medications on file.     No current facility-administered medications for this visit.     Suicidal " Ideation/Homicidal Ideation Risk Assessment  Risk Factors: none  Protective factors: Effective and accessible mental health care , Connectedness to individuals, family, community, and social institutions and Problem-solving and conflict resolution skills    Suicidal Ideation: Not Present, no hx  Self Injurious Behavior:  Not Present, no hx  Homicidal Behavior: Not Present, no hx  Estimate of Current Risk: Minimal risk    Plan for Safety-   N/A: risk is assessed to be minimal, therefore developing a safety plan is not indicated at this time.    Screening measures administered during this visit  PHQ-9: Brief Depression Severity Measure Score: 6  GAD7 Total Score: : 5    ASSESSMENT / IMPRESSIONS  Lucio Ocampo seems to be experiencing symptoms of a panic disorder (see above). Pt has no hx of mental health treatment, and he is not amenable to treatment with medication at this time. Pt would benefit from learning about panic disorder and coping strategies. He was amenable to treatment within brief model of therapy in primary care.   Severity: mild, chronicity: acute, duration: a few months  Associated factors include change in residence, childhood  Prognostic protective factors include, familial support, help-seeking. Prognostic risk factors include, none observed at this time.      PLAN     Goals     • Develop coping skills     • Reduce frequency and/or intensity of panic attacks/anxiety         Psychoeducation provided  Cycle of panic, fight or flight, symptoms of panic attacks, CBT model     Recommendations for treatment: Individual Therapy, 30 min biweekly    Recommendations for Interventions: Acceptance & Adjustment  Anxiety Reduction Techniques  Cognitive Behavior Training  Goal Setting  Insight Development  Psychoeducation    Next visit plan  Pt will practice diaphragmatic breathing five minutes daily while calm. Pt will discuss childhood events as possible precipitants of current panic disorder. Further  psychoeducation will be provided on panic attacks and cycle of panic.

## 2021-04-28 ASSESSMENT — COGNITIVE AND FUNCTIONAL STATUS - GENERAL
APPEARANCE: WELL GROOMED
PSYCHOMOTOR FUNCTIONING: WNL
SPEECH: REGULAR
REMOTE MEMORY: WNL
AFFECT: FULL RANGE
INSIGHT: IMPAIRED, MINIMALLY
CONCENTRATION: WNL
APPETITE: NO CHANGE
THOUGHT_CONTENT: APPROPRIATE
IMPULSE CONTROL: INTACT
ORIENTATION: FULLY ORIENTED
MOOD: EUTHYMIC (NORMAL)
RECENT MEMORY: WNL
SLEEP_WAKE_CYCLE: DECREASED
ATTENTION: WNL
EYE_CONTACT: WNL
PERCEPTUAL FUNCTION: NORMAL
THOUGHT_PROCESS: WNL
DELUSIONS: NONE OR AGE APPROPRIATE
AROUSAL LEVEL: ALERT

## 2021-04-28 NOTE — PROGRESS NOTES
I reviewed the Intern's/Post-Doctoral Fellow's note and agree with the documented findings and plan of care, except for my comments below or within the additional notes today.

## 2021-05-05 ENCOUNTER — APPOINTMENT (RX ONLY)
Dept: URBAN - METROPOLITAN AREA CLINIC 28 | Facility: CLINIC | Age: 36
Setting detail: DERMATOLOGY
End: 2021-05-05

## 2021-05-05 DIAGNOSIS — L91.8 OTHER HYPERTROPHIC DISORDERS OF THE SKIN: ICD-10-CM

## 2021-05-05 PROCEDURE — 11303 SHAVE SKIN LESION >2.0 CM: CPT

## 2021-05-05 PROCEDURE — ? SHAVE REMOVAL

## 2021-05-05 ASSESSMENT — LOCATION SIMPLE DESCRIPTION DERM: LOCATION SIMPLE: RIGHT LOWER BACK

## 2021-05-05 ASSESSMENT — LOCATION DETAILED DESCRIPTION DERM: LOCATION DETAILED: RIGHT SUPERIOR LATERAL MIDBACK

## 2021-05-05 ASSESSMENT — LOCATION ZONE DERM: LOCATION ZONE: TRUNK

## 2021-05-05 NOTE — PROCEDURE: SHAVE REMOVAL
Medical Necessity Information: It is in your best interest to select a reason for this procedure from the list below. All of these items fulfill various CMS LCD requirements except the new and changing color options.
Medical Necessity Clause: This procedure was medically necessary because the lesion that was treated was:
Lab: -[id number]
Detail Level: Detailed
Was A Bandage Applied: Yes
Size Of Lesion In Cm (Required): 2.5
X Size Of Lesion In Cm (Optional): 0
Biopsy Method: Dermablade
Anesthesia Type: 1% lidocaine with epinephrine
Hemostasis: Aluminum Chloride and Electrocautery
Wound Care: Vaseline
Render Path Notes In Note?: No
Consent was obtained from the patient. The risks and benefits to therapy were discussed in detail. Specifically, the risks of infection, scarring, bleeding, prolonged wound healing, incomplete removal, allergy to anesthesia, nerve injury and recurrence were addressed. Prior to the procedure, the treatment site was clearly identified and confirmed by the patient. All components of Universal Protocol/PAUSE Rule completed.
Post-Care Instructions: I reviewed with the patient in detail post-care instructions. Patient is to keep the biopsy site dry overnight, and then apply Vaseline or Aquaphor ointment twice daily until healed.
Notification Instructions: Patient will be notified of biopsy results. However, patient instructed to call the office if not contacted within 2 weeks.
Billing Type: Third-Party Bill

## 2021-05-07 RX ORDER — QUETIAPINE FUMARATE 50 MG/1
TABLET, FILM COATED ORAL
Qty: 30 TABLET | Refills: 0 | Status: SHIPPED | OUTPATIENT
Start: 2021-05-07

## 2021-05-07 NOTE — TELEPHONE ENCOUNTER
Pharmacy requesting refill, review    Patient:   Lucio Ocampo  Male, 35 y.o., 1985  Phone:   [phone] (M)  PCP:   Briseida Gama MD  Last Weight:   83.9 kg (185 lb)  HM Due?:   Due  Allergies:   No Known Allergies  MRN:   [id number]  Primary Ins.:   [employer] FIRST  Pharmacy:   [facility]/PHARMACY #[id number] - [place] [address] AT [facility] [[id number]]  Preferred Lab:   [facility]  Next Appt  With Family Medicine  05/18/2021 at 1:00 PM  My Pat List Reminders:   None  Rx Auth Request  Received: Yesterday  Interface, Surescripts In  P [facility] Support P  Caller: Unspecified (Yesterday,  3:06 AM)         Requested Prescriptions     Name from pharmacy: QUETIAPINE FUMARATE 50 MG TAB         Will file in chart as: QUEtiapine (SEROquel) 50 mg tablet    The original prescription was discontinued on 4/19/2021 by Briseida Gama MD for the following reason: Patient Discontinued Medication. Renewing this prescription may not be appropriate.    Sig: TAKE 1 TABLET BY MOUTH EVERY DAY AT NIGHT    Disp:  30 tablet    Refills:  0 (Pharmacy requested: Not specified)    Start: 5/6/2021    Class: Normal    Non-formulary    Last ordered: 3 weeks ago by Briseida Gama MD Last refill: 4/10/2021    Rx #: [id number]       To be filled at: [facility]/pharmacy #[id number] - [place] [address] AT [facility]         Med Refill    Interface, Surescripts In routed conversation to [facility] Yesterday (3:06 AM)

## 2021-05-18 ENCOUNTER — APPOINTMENT (OUTPATIENT)
Dept: FAMILY MEDICINE | Facility: CLINIC | Age: 36
End: 2021-05-18
Payer: COMMERCIAL

## 2021-06-01 ENCOUNTER — TELEMEDICINE (OUTPATIENT)
Dept: FAMILY MEDICINE | Facility: CLINIC | Age: 36
End: 2021-06-01
Payer: COMMERCIAL

## 2021-06-01 DIAGNOSIS — F41.0 PANIC DISORDER: Primary | ICD-10-CM

## 2021-06-02 NOTE — PROGRESS NOTES
Integrated Behavioral Health Follow-up Visit Note  Visit Type Performed: Audio and Video   Visit number: 2     Duration: 45 minutes   Lucio Ocampo 1985 a 35 y.o.male, who was contacted today for a behavioral health visit.    Clinician confirmed identification of patient by name and birthdate, provider name, location of patient and clinician, and callback number in case disconnected.  This visit was conducted via telehealth/telephone in lieu of an in-person visit due to precautions related to the COVID-19 pandemic.  Verification of Patient Location: The patient affirms they are currently located in the following state: [place]    Request for Consent: You and I are about to have a tele-health check-in or visit. This is allowed because you are already a patient of our [facility] practice, and you have requested it.  Before starting our telemedicine visit, I am required to get your consent for this virtual check-in or visit by tele-health . Do you consent?    Patient Response to Request for Consent: Yes    SUBJECTIVE     Reason for visit: Pt was experiencing panic attacks. Pt discussed childhood trauma within the context of anxiety.      Symptoms  Depression symptoms: guilt  Anxiety symptoms: Irritability- yes, Muscle tension- yes, Sleep changes- yes and Panic Attacks- no, none since last seen  Additional reported symptoms: he acknowledged guardedness/mistrust of others related to childhood experiences (see below).      OBJECTIVE     Mental Status Evaluation  Patient's mood and affect were consistent with the context, and consistent with their baseline: Yes   Comments:  calm and serious, alert,oriented, in NAD with a full range of affect, normal behavior and no psychotic features    Suicidal Ideation/Homicidal Ideation Risk Assessment: not assessed. If not assessed, reason:  Assessment of SI/HI is not indicated at this time, based on prior assessments (pt has always denied SI/HI, pt presents with no significant  risk factors, and pt has protective factors)  Risk Factors: none  Protective factors: Effective and accessible mental health care , Connectedness to individuals, family, community, and social institutions and Problem-solving and conflict resolution skills    Plan for Safety-   N/A: risk is assessed to be minimal, therefore developing a safety plan is not indicated at this time.    Interventions  Anxiety Reduction Techniques  Insight Development  Psychoeducation  We discussed cycle of panic, fight or flight, grounding, and non judgmental acceptance of symptoms of panic. Symptoms of panic were compared to normal sensations during exercise without catastrophic interpretation. Pt discussed childhood experiences as a possible influence in the development of panic attacks including housing and familial instability from 7 years of age up to 17-18 years of age. Pt now has a supportive family, but struggled to establish stability/trust others. This may have influenced his current interpersonal relationships.     Psychotropic medications: adherence and efficacy not assessed.  Current Outpatient Medications   Medication Sig Dispense Refill   • QUEtiapine (SEROquel) 50 mg tablet TAKE 1 TABLET BY MOUTH EVERY DAY AT NIGHT 30 tablet 0     No current facility-administered medications for this visit.       ASSESSMENT       Progress  Patient's progress toward their goals is generally improving (Pt has developed adaptive cognitive responses to decatastrophize interpretation of panic symptoms. He is also effectively and regularly implementing anxiety reduction strategies.)   Patient's symptomology is gradually improving (Through early identification of bodily sensations related to panic attacks, pt has been able to accept sensations with less fear to reduce subsequent symptoms of fight or flight. Pt denied any panic attacks since last seen.)        PLAN     Goals     • Develop coping skills     • Reduce frequency and/or intensity of  panic attacks/anxiety           Psychoeducation provided on  Panic attacks     Recommendations  Individual Therapy  30 minutes biweekly    Next visit plan:  Pt and clinician will plan for pt's transition to new clinician at [facility] at the end of the month. Pt will continue to implement anxiety reduction strategies. Pt will continue to process trauma that may be underlying anxiety with new clinician.

## 2021-06-15 ENCOUNTER — DOCUMENTATION (OUTPATIENT)
Dept: FAMILY MEDICINE | Facility: CLINIC | Age: 36
End: 2021-06-15

## 2021-06-15 NOTE — PROGRESS NOTES
Pt was contacted by phone five minutes into session. He was driving and did not return home until twenty minutes into session. Therefore, an informal check-in was conducted. Pt indicated that progress in regard to panic attacks has been maintained. He denied any panic attacks for approximately one month. Plan for transition to clinician at [facility] was discussed. It was collaboratively decided and agreed upon that transitioning to new clinician at this time will be in the pt's best interest. Pt would like to process childhood trauma possibly underlying anxiety with new clinician. Alejandra Marshall, licensed social worker at [facility], was contacted to schedule pt intake as soon as possible.

## 2021-06-16 ENCOUNTER — TELEPHONE (OUTPATIENT)
Dept: PSYCHOLOGY | Facility: HOSPITAL | Age: 36
End: 2021-06-16

## 2021-06-16 NOTE — TELEPHONE ENCOUNTER
I called this pt to schedule an intake for [facility] psychotherapy at [facility]. He is a former [facility] client who was seen by former psychologist for three sessions. I left a voicemail with instructions to call if he is interested in starting sessions with me.

## 2021-06-18 NOTE — TELEPHONE ENCOUNTER
Pharmacy requesting refill, review    Patient:   Lucio Ocampo  Male, 35 y.o., 1985  Phone:   [phone] (M)  PCP:   Briseida Gama MD  Last Weight:   83.9 kg (185 lb)  HM Due?:   Due  Allergies:   No Known Allergies  MRN:   [id number]  Primary Ins.:   [employer]  Pharmacy:   [facility]/PHARMACY #[id number] CAMACHO [address] AT [facility] [[id number]]  Preferred Lab:   [facility]  My Pat List Reminders:   None  Rx Auth Request  Received: Today  Interface, Surescripts In  P [facility] Support [facility]  Caller: Unspecified (Today, 11:27 AM)         Requested Prescriptions     Name from pharmacy: HYDROXYZINE JOSHUA 25 MG CAP         Will file in chart as: hydrOXYzine (VISTARIL) 25 mg capsule    Sig: Take 1 capsule (25 mg total) by mouth 2 (two) times a day as needed for itching for up to 10 days.    Disp:  30 capsule    Refills:  0 (Pharmacy requested: Not specified)    Start: 6/18/2021    Class: Normal    Non-formulary    Last ordered: 3 months ago by Briseida Gama MD Last refill: 3/15/2021    Rx #: [id number]       To be filled at: [facility]/pharmacy #CINDY CABRERA [address] [place] [address]         Med Refill

## 2021-06-23 RX ORDER — HYDROXYZINE PAMOATE 25 MG/1
25 CAPSULE ORAL 2 TIMES DAILY PRN
Qty: 30 CAPSULE | Refills: 0 | Status: SHIPPED | OUTPATIENT
Start: 2021-06-23 | End: 2021-07-03

## 2021-12-22 ENCOUNTER — HOSPITAL ENCOUNTER (EMERGENCY)
Facility: HOSPITAL | Age: 36
Discharge: HOME | End: 2021-12-22
Attending: EMERGENCY MEDICINE | Admitting: EMERGENCY MEDICINE
Payer: COMMERCIAL

## 2021-12-22 VITALS
SYSTOLIC BLOOD PRESSURE: 120 MMHG | DIASTOLIC BLOOD PRESSURE: 78 MMHG | HEIGHT: 72 IN | TEMPERATURE: 98.8 F | RESPIRATION RATE: 20 BRPM | BODY MASS INDEX: 25.06 KG/M2 | HEART RATE: 78 BPM | WEIGHT: 185 LBS | OXYGEN SATURATION: 99 %

## 2021-12-22 DIAGNOSIS — U07.1 COVID-19 VIRUS INFECTION: Primary | ICD-10-CM

## 2021-12-22 LAB
FLUAV RNA SPEC QL NAA+PROBE: NEGATIVE
FLUBV RNA SPEC QL NAA+PROBE: NEGATIVE
RSV RNA SPEC QL NAA+PROBE: NEGATIVE
SARS-COV-2 RNA RESP QL NAA+PROBE: POSITIVE

## 2021-12-22 PROCEDURE — 99283 EMERGENCY DEPT VISIT LOW MDM: CPT

## 2021-12-22 PROCEDURE — 87637 SARSCOV2&INF A&B&RSV AMP PRB: CPT | Performed by: EMERGENCY MEDICINE

## 2021-12-22 ASSESSMENT — ENCOUNTER SYMPTOMS
BACK PAIN: 0
FEVER: 1
FLANK PAIN: 0
HEADACHES: 1
CHEST TIGHTNESS: 0
SHORTNESS OF BREATH: 0
ABDOMINAL PAIN: 0

## 2021-12-22 NOTE — DISCHARGE INSTRUCTIONS
You have Covid infection.  You will need to stay quarantined for the next 14 days and avoid work.  Please return if worse especially if you are having shortness of breath.  Use Tylenol as needed.

## 2021-12-22 NOTE — Clinical Note
Lucio Ocampo was seen and treated in our emergency department on 12/22/2021.  He may return to work on 01/04/2022.       If you have any questions or concerns, please don't hesitate to call.      Eric Franco, DO

## 2021-12-22 NOTE — ED PROVIDER NOTES
Emergency Medicine Note  HPI   HISTORY OF PRESENT ILLNESS     36-year-old male presents with headache, myalgias and fatigue since yesterday.  Symptoms came on gradually and now persistent and moderate intensity.  Nothing makes it better or worse.  There is no associated cough, dyspnea or pleuritic chest pain.  He denies any vomiting or diarrhea with the symptoms.            Patient History   PAST HISTORY     Reviewed from Nursing Triage:       History reviewed. No pertinent past medical history.    History reviewed. No pertinent surgical history.    Family History   Family history unknown: Yes       Social History     Tobacco Use   • Smoking status: Former Smoker     Quit date:      Years since quittin.9   • Smokeless tobacco: Never Used   Substance Use Topics   • Alcohol use: Yes   • Drug use: Yes     Types: Marijuana     Comment: daily         Review of Systems   REVIEW OF SYSTEMS     Review of Systems   Constitutional: Positive for fever.   HENT: Negative for congestion.    Respiratory: Negative for chest tightness and shortness of breath.    Cardiovascular: Negative for chest pain.   Gastrointestinal: Negative for abdominal pain.   Genitourinary: Negative for flank pain.   Musculoskeletal: Negative for back pain.   Neurological: Positive for headaches.         VITALS     ED Vitals    Date/Time Temp Pulse Resp BP SpO2 [facility]   21 1140 37.1 °C (98.8 °F) 82 18 125/89 97 % TNC                       Physical Exam   PHYSICAL EXAM     Physical Exam  Constitutional:       Appearance: Normal appearance. He is well-developed.   HENT:      Head: Normocephalic.      Mouth/Throat:      Mouth: Mucous membranes are moist.   Eyes:      Conjunctiva/sclera: Conjunctivae normal.   Cardiovascular:      Rate and Rhythm: Normal rate and regular rhythm.   Pulmonary:      Effort: Pulmonary effort is normal.      Breath sounds: Normal breath sounds.   Abdominal:      General: Bowel sounds are normal.      Palpations: Abdomen  is soft.   Musculoskeletal:         General: Normal range of motion.      Cervical back: Neck supple.   Skin:     General: Skin is warm and dry.      Capillary Refill: Capillary refill takes less than 2 seconds.   Neurological:      Mental Status: He is alert and oriented to person, place, and time.   Psychiatric:         Mood and Affect: Mood normal.           PROCEDURES     Procedures     DATA     Results     Procedure Component Value Units Date/Time    SARS-CoV-2 (COVID-19), PCR Nasopharynx [[id number]]  (Abnormal) Collected: 12/22/21 1324    Specimen: Nasopharyngeal Swab from Nasopharynx Updated: 12/22/21 1456    Narrative:      The following orders were created for panel order SARS-CoV-2 (COVID-19), PCR Nasopharynx.  Procedure                               Abnormality         Status                     ---------                               -----------         ------                     SARS-COV-2 (COVID-19)/ F...[[id number]]  Abnormal            Final result                 Please view results for these tests on the individual orders.    SARS-COV-2 (COVID-19)/ FLU A/B, AND RSV, PCR Nasopharynx [[id number]]  (Abnormal) Collected: 12/22/21 1324    Specimen: Nasopharyngeal Swab from Nasopharynx Updated: 12/22/21 1456     SARS-CoV-2 (COVID-19) Positive     Influenza A Negative     Influenza B Negative     Respiratory Syncytial Virus Negative          Imaging Results    None         No orders to display       Scoring tools                                 ED Course & MDM   MDM / ED COURSE and CLINICAL IMPRESSIONS     MDM  Number of Diagnoses or Management Options  COVID-19 virus infection  Diagnosis management comments: 36-year-old male presents with 24 hours of constitutional symptoms.  Differential diagnosis includes Covid infection, influenza, RSV and other viruses.  Doubt pneumonia.    Risk of Complications, Morbidity, and/or Mortality  Presenting problems: moderate        ED Course as of 12/22/21 1505   Wed Dec  22, 2021   1456 COVID positive  D/C home  I discusses symptoms and what to look for. Flu neg. Tylenol as needed.  [SC]      ED Course User Index  [SC] Eric Franco, DO         Clinical Impressions as of 12/22/21 1505   COVID-19 virus infection            Eric Franco, DO  12/22/21 1508